# Patient Record
(demographics unavailable — no encounter records)

---

## 2025-07-24 NOTE — HISTORY OF PRESENT ILLNESS
[FreeTextEntry1] : Patient is a 19 year old female, with PMH of SIBO, who presents for 2nd opinion.   Pt presents with her mother. She states that about 1 year ago, started noticing excessive bloating and gas, worse post prandial. She got tested for celiac bc her disease has celiac disease and [facility] testing was negative, although she had a positive gene for celiac disease. She went to college and symptoms persisted.   She suffered a "GI bug" in mid spring 2025 with nausea, vomiting and diarrhea and since then, has had persistent abdominal cramping with associated nausea and intermittent diarrhea, no vomiting. She tried cutting out dairy with no relief. 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 She was treated with Xifaxan x 14 days with some relief but not complete relief. She has been off Xifaxan x 2 weeks.   Patient denies any significant cardiac or pulmonary conditions.   Patient denies pyrosis, dysphagia, rectal bleeding, vomiting, or unexplained weight loss.

## 2025-07-24 NOTE — PHYSICAL EXAM
[Alert] : alert [Normal Voice/Communication] : normal voice/communication [Healthy Appearing] : healthy appearing [No Acute Distress] : no acute distress [Sclera] : the sclera and conjunctiva were normal [Hearing Threshold Finger Rub Not Crane] : hearing was normal [Normal Lips/Gums] : the lips and gums were normal [Oropharynx] : the oropharynx was normal [Normal Appearance] : the appearance of the neck was normal [No Neck Mass] : no neck mass was observed [No Respiratory Distress] : no respiratory distress [No Acc Muscle Use] : no accessory muscle use [Oriented To Time, Place, And Person] : oriented to person, place, and time

## 2025-07-24 NOTE — REASON FOR VISIT
[Consultation] : a consultation visit [Parent] : parent [FreeTextEntry1] : change in bowel habits, bloating

## 2025-07-24 NOTE — ASSESSMENT
[FreeTextEntry1] : Patient is a 19 year old female, with PMH of SIBO, who presents for 2nd opinion.  +SIBO, methane predominant vs post infectious IBS Will treat with Xifaxan 550mg TID x 2 weeks along with Neomycin 500mg BID x 2 weeks   Will order labs to r/o underlying inflammatory condition including repeat celiac testing, TSH, CRP/ESR, fecal calprotectin and fecal elastase  If positive, will consider endoscopic evaluation  Plan for f/u in office in 4 months, sooner if needed